# Patient Record
Sex: MALE | Race: WHITE | NOT HISPANIC OR LATINO
[De-identification: names, ages, dates, MRNs, and addresses within clinical notes are randomized per-mention and may not be internally consistent; named-entity substitution may affect disease eponyms.]

---

## 2020-02-12 ENCOUNTER — TRANSCRIPTION ENCOUNTER (OUTPATIENT)
Age: 65
End: 2020-02-12

## 2020-02-13 ENCOUNTER — OUTPATIENT (OUTPATIENT)
Dept: OUTPATIENT SERVICES | Facility: HOSPITAL | Age: 65
LOS: 1 days | End: 2020-02-13
Payer: COMMERCIAL

## 2020-02-13 DIAGNOSIS — Z12.11 ENCOUNTER FOR SCREENING FOR MALIGNANT NEOPLASM OF COLON: ICD-10-CM

## 2020-02-13 PROCEDURE — 45378 DIAGNOSTIC COLONOSCOPY: CPT

## 2020-02-13 PROCEDURE — 82962 GLUCOSE BLOOD TEST: CPT

## 2021-11-24 ENCOUNTER — APPOINTMENT (OUTPATIENT)
Dept: OTOLARYNGOLOGY | Facility: CLINIC | Age: 66
End: 2021-11-24
Payer: COMMERCIAL

## 2021-11-24 VITALS
WEIGHT: 273 LBS | HEIGHT: 75 IN | DIASTOLIC BLOOD PRESSURE: 75 MMHG | BODY MASS INDEX: 33.94 KG/M2 | SYSTOLIC BLOOD PRESSURE: 111 MMHG

## 2021-11-24 DIAGNOSIS — H93.293 OTHER ABNORMAL AUDITORY PERCEPTIONS, BILATERAL: ICD-10-CM

## 2021-11-24 DIAGNOSIS — G47.33 OBSTRUCTIVE SLEEP APNEA (ADULT) (PEDIATRIC): ICD-10-CM

## 2021-11-24 DIAGNOSIS — Z86.39 PERSONAL HISTORY OF OTHER ENDOCRINE, NUTRITIONAL AND METABOLIC DISEASE: ICD-10-CM

## 2021-11-24 DIAGNOSIS — Z78.9 OTHER SPECIFIED HEALTH STATUS: ICD-10-CM

## 2021-11-24 DIAGNOSIS — Z99.89 OBSTRUCTIVE SLEEP APNEA (ADULT) (PEDIATRIC): ICD-10-CM

## 2021-11-24 PROCEDURE — 69210 REMOVE IMPACTED EAR WAX UNI: CPT

## 2021-11-24 PROCEDURE — 99213 OFFICE O/P EST LOW 20 MIN: CPT | Mod: 25

## 2021-11-24 RX ORDER — EMPAGLIFLOZIN 25 MG/1
TABLET, FILM COATED ORAL
Refills: 0 | Status: ACTIVE | COMMUNITY

## 2021-11-24 RX ORDER — CHROMIUM 200 MCG
TABLET ORAL
Refills: 0 | Status: ACTIVE | COMMUNITY

## 2021-11-24 NOTE — HISTORY OF PRESENT ILLNESS
[de-identified] : Allan Goldberg is a 65 yo male with hx of Type 2 DM who presents for evaluation of cerumen impaction. He notes intermittent bilateral otalgia. He notes some hearing change particularly watching TV. He denies tinnitus. He has rare vertigo when he takes his antihypertensive medication. He denies otalgia, otorrhea, recent ear infections.\par \par Of note, he also has right septal deviation. He has right nasal obstruction. He denies sinus pressure, rhinorrhea, or postnasal drainage. He has tried using nasal steroids previously and these have not helped.

## 2021-11-24 NOTE — PHYSICAL EXAM
[de-identified] : Bilateral cerumen impaction [] : septum deviated to the right [de-identified] : Negative parish maneuver [Midline] : trachea located in midline position [Normal] : no rashes

## 2021-11-24 NOTE — ASSESSMENT
[FreeTextEntry1] : Allan Goldberg presents for bilateral cerumen impaction. Once removed, he noted improvement in otologic symptoms and hearing. He would like a hearing test after the new year. In addition, he has right sided septal deviatoin with right nasla obstruction, not responsive to prevoius topical therapy. HE would like to consider septoplasy and SMR of inferior turbinates.\par \par - Follow up in 2 months for audiogram and to discuss surgery again.

## 2021-11-24 NOTE — REASON FOR VISIT
[Subsequent Evaluation] : a subsequent evaluation for [Ear Pain] : ear pain [FreeTextEntry2] : ear cleaning

## 2022-01-25 ENCOUNTER — APPOINTMENT (OUTPATIENT)
Dept: OTOLARYNGOLOGY | Facility: CLINIC | Age: 67
End: 2022-01-25

## 2022-03-14 ENCOUNTER — APPOINTMENT (OUTPATIENT)
Dept: OTOLARYNGOLOGY | Facility: CLINIC | Age: 67
End: 2022-03-14
Payer: COMMERCIAL

## 2022-03-14 VITALS
SYSTOLIC BLOOD PRESSURE: 128 MMHG | HEIGHT: 75 IN | HEART RATE: 76 BPM | BODY MASS INDEX: 33.57 KG/M2 | DIASTOLIC BLOOD PRESSURE: 78 MMHG | WEIGHT: 270 LBS

## 2022-03-14 DIAGNOSIS — E03.9 HYPOTHYROIDISM, UNSPECIFIED: ICD-10-CM

## 2022-03-14 PROCEDURE — 31231 NASAL ENDOSCOPY DX: CPT

## 2022-03-14 PROCEDURE — 99214 OFFICE O/P EST MOD 30 MIN: CPT | Mod: 25

## 2022-03-14 RX ORDER — METFORMIN ER 750 MG 750 MG/1
750 TABLET ORAL
Qty: 60 | Refills: 0 | Status: ACTIVE | COMMUNITY
Start: 2021-06-13

## 2022-03-14 RX ORDER — LEVOTHYROXINE SODIUM 0.14 MG/1
137 TABLET ORAL
Qty: 30 | Refills: 0 | Status: ACTIVE | COMMUNITY
Start: 2022-03-07

## 2022-03-14 RX ORDER — BLOOD SUGAR DIAGNOSTIC
STRIP MISCELLANEOUS
Qty: 50 | Refills: 0 | Status: ACTIVE | COMMUNITY
Start: 2021-11-26

## 2022-03-14 RX ORDER — POTASSIUM CHLORIDE 750 MG/1
10 TABLET, EXTENDED RELEASE ORAL
Qty: 60 | Refills: 0 | Status: ACTIVE | COMMUNITY
Start: 2022-03-07

## 2022-03-14 RX ORDER — HYDROCHLOROTHIAZIDE 25 MG/1
25 TABLET ORAL
Qty: 30 | Refills: 0 | Status: ACTIVE | COMMUNITY
Start: 2021-06-14

## 2022-03-14 NOTE — DATA REVIEWED
[de-identified] : Outside CT Facial bone: Acute fracture of bilateral nasal bones with displaced left nasal bone fracture.

## 2022-03-14 NOTE — HISTORY OF PRESENT ILLNESS
[de-identified] : Allan Goldberg is a 67 yo male with hx of Type 2 DM who presents for evaluation of cerumen impaction. He notes intermittent bilateral otalgia. He notes some hearing change particularly watching TV. He denies tinnitus. He has rare vertigo when he takes his antihypertensive medication. He denies otalgia, otorrhea, recent ear infections.\par \par Of note, he also has right septal deviation. He has right nasal obstruction. He denies sinus pressure, rhinorrhea, or postnasal drainage. He has tried using nasal steroids previously and these have not helped. [FreeTextEntry1] : 3/14/22 - Patient presents for follow-up. He states that he fell down while running on 3/10/22. He had epistaxis which resolved. He presented to the ED. CT head was normal. CT facial bone showed nasal bone fracture. He denies vision chagnes or pain/restrictionf of extraocular movements. He loss of consciousness. He states that the bite feels normal. He denies facial weakness or facial numbness. He denies fevers or chills. He feels that his nasal dorsum is shifted to the left. He denies nasal obstruction.

## 2022-03-14 NOTE — ASSESSMENT
[FreeTextEntry1] : Allan Goldberg presents for evaluation of nasal bone fracture. This occurred on 3/10/22. CT facial bone shows bilateral nasal bone fractures. There is no septal hematoma on endoscopy. He has existing septal deviation to the right. We discussed closed reduction of nasal bone fracture in the OR. Risks of surgery were discussed including but not limited to bleeding, infection, cosmetic deformity, re-deviation of nose. All questions were answered. We will proceed with surgery.\par \par Follow up 1 week postop.

## 2022-03-14 NOTE — PHYSICAL EXAM
[Nasal Endoscopy Performed] : nasal endoscopy was performed, see procedure section for findings [] : septum deviated to the right [Midline] : trachea located in midline position [Normal] : no rashes [de-identified] : Dorsal deviation to the left with bruise over nasal dorsum. No septal hematoma. [FreeTextEntry2] : Small healed laceration of right upper eyelid.

## 2022-03-16 ENCOUNTER — APPOINTMENT (OUTPATIENT)
Dept: OTOLARYNGOLOGY | Facility: CLINIC | Age: 67
End: 2022-03-16
Payer: OTHER MISCELLANEOUS

## 2022-03-16 ENCOUNTER — TRANSCRIPTION ENCOUNTER (OUTPATIENT)
Age: 67
End: 2022-03-16

## 2022-03-16 VITALS
HEART RATE: 63 BPM | WEIGHT: 270 LBS | HEIGHT: 75 IN | BODY MASS INDEX: 33.57 KG/M2 | DIASTOLIC BLOOD PRESSURE: 82 MMHG | SYSTOLIC BLOOD PRESSURE: 132 MMHG

## 2022-03-16 DIAGNOSIS — E11.9 TYPE 2 DIABETES MELLITUS W/OUT COMPLICATIONS: ICD-10-CM

## 2022-03-16 PROCEDURE — 99244 OFF/OP CNSLTJ NEW/EST MOD 40: CPT

## 2022-03-16 PROCEDURE — 99072 ADDL SUPL MATRL&STAF TM PHE: CPT

## 2022-03-16 RX ORDER — DULAGLUTIDE 0.75 MG/.5ML
0.75 INJECTION, SOLUTION SUBCUTANEOUS
Refills: 0 | Status: COMPLETED | COMMUNITY
End: 2022-03-16

## 2022-03-16 RX ORDER — LOSARTAN POTASSIUM 100 MG/1
100 TABLET, FILM COATED ORAL
Refills: 0 | Status: DISCONTINUED | COMMUNITY
End: 2022-03-16

## 2022-03-16 RX ORDER — LOSARTAN POTASSIUM AND HYDROCHLOROTHIAZIDE 25; 100 MG/1; MG/1
100-25 TABLET ORAL
Refills: 0 | Status: ACTIVE | COMMUNITY

## 2022-03-16 RX ORDER — METFORMIN HYDROCHLORIDE 1000 MG/1
1000 TABLET, FILM COATED, EXTENDED RELEASE ORAL
Refills: 0 | Status: COMPLETED | COMMUNITY
End: 2022-03-16

## 2022-03-16 RX ORDER — LOSARTAN POTASSIUM AND HYDROCHLOROTHIAZIDE 25; 100 MG/1; MG/1
100-25 TABLET ORAL
Qty: 30 | Refills: 0 | Status: COMPLETED | COMMUNITY
Start: 2021-11-07 | End: 2022-03-16

## 2022-03-16 RX ORDER — CHLORHEXIDINE GLUCONATE, 0.12% ORAL RINSE 1.2 MG/ML
0.12 SOLUTION DENTAL
Qty: 473 | Refills: 0 | Status: COMPLETED | COMMUNITY
Start: 2021-09-02 | End: 2022-03-16

## 2022-03-16 NOTE — PHYSICAL EXAM
[Hearing Turner Test (Tuning Fork On Forehead)] : no lateralization of tone [Normal] : mucosa is normal [Midline] : trachea located in midline position [Removed] : palatine tonsils previously removed [FreeTextEntry9] : Cerumen impaction removed via curettage.  [FreeTextEntry1] : nasal dorsum displaced to the left side. bump on the top. no septal hematoma. septum deviated to right- old in nature. [de-identified] : no entrapment of the globes.  b/l ecchymosis right eye worse than left.

## 2022-03-16 NOTE — ASSESSMENT
[FreeTextEntry1] : Reviewed and reconciled medications, allergies, PMHx, PSHx, SocHx, FMHx. \par \par h/o MATHEW- on CPAP\par nasal fx\par left cerumen impaction\par nasal dorsum displaced to the left side. \par bump on the top of nose \par septum deviated to right- old in nature\par \par CT facial bones 3/10/22: post traumatic fx of b/l nasal bones displaced to the left. multiple breaks displaced to the left and depressed. \par \par Plan:\par Cerumen removed. Reviewed CT head results. Discussed r/b/a of closed reduction nasal fx.

## 2022-03-16 NOTE — ADDENDUM
[FreeTextEntry1] : Documented by Dwight Wagner acting as scribe for Dr. Ames on 03/16/2022.\par \par All Medical record entries made by the Scribe were at my, Dr. Ames, direction and personally dictated by me on 03/16/2022. I have reviewed the chart and agree that the record accurately reflects my personal performance of the history, physical exam, assessment and plan. I have also personally directed, reviewed, and agreed with the discharge instructions.

## 2022-03-16 NOTE — HISTORY OF PRESENT ILLNESS
[de-identified] : The patient presents today for initial consultation for nasal fx. 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 Pt reports that he suffered a contusion to the head, an abrasion above right eyebrow and a broken nose. Pt had CT imaging done soon after the accident. \par \par

## 2022-03-16 NOTE — CONSULT LETTER
[Dear  ___] : Dear  [unfilled], [Consult Letter:] : I had the pleasure of evaluating your patient, [unfilled]. [Please see my note below.] : Please see my note below. [Consult Closing:] : Thank you very much for allowing me to participate in the care of this patient.  If you have any questions, please do not hesitate to contact me. [Sincerely,] : Sincerely, [FreeTextEntry3] : Myke Ames MD FACS

## 2022-03-24 ENCOUNTER — APPOINTMENT (OUTPATIENT)
Dept: OTOLARYNGOLOGY | Facility: AMBULATORY MEDICAL SERVICES | Age: 67
End: 2022-03-24
Payer: OTHER MISCELLANEOUS

## 2022-03-24 PROCEDURE — 21320 CLSD TX NSL FX W/MNPJ&STABLJ: CPT

## 2022-04-04 ENCOUNTER — APPOINTMENT (OUTPATIENT)
Dept: OTOLARYNGOLOGY | Facility: CLINIC | Age: 67
End: 2022-04-04
Payer: OTHER MISCELLANEOUS

## 2022-04-04 VITALS
HEIGHT: 75 IN | WEIGHT: 270 LBS | HEART RATE: 75 BPM | SYSTOLIC BLOOD PRESSURE: 130 MMHG | BODY MASS INDEX: 33.57 KG/M2 | DIASTOLIC BLOOD PRESSURE: 85 MMHG

## 2022-04-04 DIAGNOSIS — S09.93XS UNSPECIFIED INJURY OF FACE, SEQUELA: ICD-10-CM

## 2022-04-04 PROCEDURE — 99072 ADDL SUPL MATRL&STAF TM PHE: CPT

## 2022-04-04 PROCEDURE — 99212 OFFICE O/P EST SF 10 MIN: CPT

## 2022-04-04 NOTE — HISTORY OF PRESENT ILLNESS
[de-identified] : The patient presents today s/p closed reduction of nasal septal fracture 03/24/2022. Pt reports having lots of debride present.

## 2022-04-04 NOTE — PROCEDURE
[FreeTextEntry1] : mucus, crusting, scabbing, and cast removed [FreeTextEntry2] : post op patency  [FreeTextEntry3] : deviated septum to the right \par lots of dry mucus, crusting, and scabbing all removed through suction bilaterally \par old nose cast removed - new cast placed on

## 2022-04-04 NOTE — ASSESSMENT
[FreeTextEntry1] : Reviewed and reconciled medications, allergies, PMHx, PSHx, SocHx, FMHx. \par \par s/p closed reduction of nasal septal fracture 03/24/2022\par \par Plan:\par mucus, crusting, scabbing, and cast removed - wear the cast at night when sleeping for at least 1 week. new cast placed on. can go back to work. continue using saline gel spray. FU prn  \par \par \par \par

## 2022-04-04 NOTE — ADDENDUM
[FreeTextEntry1] : Documented by Janis Yates acting as scribe for Dr. Ames on 04/04/2022. \par \par All Medical record entries made by the scribe were at my. Dr. Ames direction and personally dictated by me on 04/04/2022. I have reviewed the chart and agree that the record accurately reflects my personal performance of the history, physical exam, assessment and plan. I have also personally directed, reviewed, and agreed with the discharge instructions.

## 2022-04-04 NOTE — CONSULT LETTER
[Dear  ___] : Dear  [unfilled], [Courtesy Letter:] : I had the pleasure of seeing your patient, [unfilled], in my office today. [Please see my note below.] : Please see my note below. [Consult Closing:] : Thank you very much for allowing me to participate in the care of this patient.  If you have any questions, please do not hesitate to contact me. [Sincerely,] : Sincerely, [FreeTextEntry3] : Myke Ames MD FACS

## 2022-04-16 ENCOUNTER — EMERGENCY (EMERGENCY)
Facility: HOSPITAL | Age: 67
LOS: 1 days | Discharge: HOME | End: 2022-04-16
Attending: EMERGENCY MEDICINE | Admitting: EMERGENCY MEDICINE
Payer: COMMERCIAL

## 2022-04-16 VITALS
OXYGEN SATURATION: 95 % | SYSTOLIC BLOOD PRESSURE: 131 MMHG | HEART RATE: 70 BPM | TEMPERATURE: 98 F | DIASTOLIC BLOOD PRESSURE: 81 MMHG | RESPIRATION RATE: 20 BRPM

## 2022-04-16 VITALS
TEMPERATURE: 98 F | RESPIRATION RATE: 20 BRPM | DIASTOLIC BLOOD PRESSURE: 83 MMHG | HEART RATE: 74 BPM | HEIGHT: 75 IN | SYSTOLIC BLOOD PRESSURE: 146 MMHG | OXYGEN SATURATION: 95 % | WEIGHT: 270.07 LBS

## 2022-04-16 PROCEDURE — 99284 EMERGENCY DEPT VISIT MOD MDM: CPT

## 2022-04-16 NOTE — CONSULT NOTE ADULT - SUBJECTIVE AND OBJECTIVE BOX
[facility] DEPARTMENT OF OPHTHALMOLOGY - INITIAL ADULT CONSULT  -----------------------------------------------------------------------------  Andreina Parker MD, MPH, PGY-3  Pager: [phone]  -----------------------------------------------------------------------------  note started prior to pt encounter    HPI: 67y.o. M c/o floaters right eye, has h/o floaters previously and follows with an ophthalmologist, but now floaters have increased in the right lateral visual field, no loss of vision, no pain, no ha, no discharge, pt reports falling about 5wks ago, at the time had periorbital hematoma/nasal fx/other ortho injuries, seen by ED, ct face without periorbital fracture, had no visual changes at that time, pt concerned that this could be delayed sequelae of the fall (states was a workman's comp issue.      PMH: ***  POcHx: denies surg/laser  FH: denies glc/amd  Social History: denies etoh/tobacco  Ophthalmic Medications: none  Allergies: NKDA    Review of Systems:  Constitutional: No fever, chills  Eyes: No blurry vision, flashes, floaters, FBS, erythema, discharge, double vision, OU  Neuro: No tremors  Cardiovascular: No chest pain, palpitations  Respiratory: No SOB, no cough  GI: No nausea, vomiting, abdominal pain  : No dysuria  Skin: no rash  Psych: no depression  Endocrine: no polyuria, polydipsia  Heme/lymph: no swelling    VITALS: T(C): 36.6 (04-16-22 @ 21:08)  T(F): 97.8 (04-16-22 @ 21:08), Max: 97.8 (04-16-22 @ 21:08)  HR: 74 (04-16-22 @ 21:08) (74 - 74)  BP: 146/83 (04-16-22 @ 21:08) (146/83 - 146/83)  RR:  (20 - 20)  SpO2:  (95% - 95%)  Wt(kg): --  General: AAO x 3, appropriate mood and affect    Ophthalmology Exam:  Visual acuity (sc): 20/20 OU  Pupils: PERRL OU, no APD  Ttono: 16 OU  Extraocular movements (EOMs): Full OU, no pain, no diplopia  Confrontational Visual Field (CVF): Full OU  Color Plates: 12/12 OU    Pen Light Exam (PLE)  External: Flat OU  Lids/Lashes/Lacrimal Ducts: Flat OU    Sclera/Conjunctiva: W+Q OU  Cornea: Cl OU  Anterior Chamber: D+F OU    Iris: Flat OU  Lens: Cl OU    Fundus Exam: dilated with 1% tropicamide and 2.5% phenylephrine  Approval obtained from primary team for dilation  Patient aware that pupils can remained dilated for at least 4-6 hours  Exam performed with 20D lens    Vitreous: wnl OU  Disc, cup/disc: sharp and pink, 0.4 OU  Macula: wnl OU  Vessels: wnl OU  Periphery: wnl OU    Labs/Imaging:  *** [facility] DEPARTMENT OF OPHTHALMOLOGY - INITIAL ADULT CONSULT  -----------------------------------------------------------------------------  Andreina Parker MD, MPH, PGY-3  Pager: [phone]  -----------------------------------------------------------------------------    HPI: 67y.o. M c/o floaters right eye, has h/o floaters previously and follows with an ophthalmologist, but now floaters have increased in the right lateral visual field, no loss of vision, no pain, no ha, no discharge, pt reports falling about 5wks ago, at the time had periorbital hematoma/nasal fx/other ortho injuries, seen by ED, ct face without periorbital fracture, had no visual changes at that time, pt concerned that this could be delayed sequelae of the fall (states was a workman's comp issue).     Ophtho consulted for pt concern of retinal detachment in s/o floaters, right eye. Pt states he had floaters in the past but over the last 24 hrs he has noticed a cluster of floaters in the temporal part of his visual field that is more apparent in bright light. No association with flashes of light and no curtain. Denies loss of vision. Wife at bedside and states that pt reported the floaters come and go and were improving this morning. Pt had a fall 3/10 and he is concerned that the symptoms are related. Follows with Dr. Palencia (Steve) for regular eye care, annual diabetic eye exams and refraction. Has never had lasers or injections or ophthalmic surgery. Of note, pt works as a podiatrist at [employer].     PMH: per HPI   POcHx: denies surg/laser  FH: denies glc/amd  Social History: denies etoh/tobacco  Ophthalmic Medications: none  Allergies: NKDA    Review of Systems:  Constitutional: No fever, chills  Eyes: No blurry vision, flashes, + floaters, FBS, erythema, discharge, double vision, OU  Neuro: No tremors  Cardiovascular: No chest pain, palpitations  Respiratory: No SOB, no cough  GI: No nausea, vomiting, abdominal pain  : No dysuria  Skin: no rash  Psych: no depression  Endocrine: no polyuria, polydipsia  Heme/lymph: no swelling    VITALS: T(C): 36.6 (04-16-22 @ 21:08)  T(F): 97.8 (04-16-22 @ 21:08), Max: 97.8 (04-16-22 @ 21:08)  HR: 74 (04-16-22 @ 21:08) (74 - 74)  BP: 146/83 (04-16-22 @ 21:08) (146/83 - 146/83)  RR:  (20 - 20)  SpO2:  (95% - 95%)  Wt(kg): --  General: AAO x 3, appropriate mood and affect    Ophthalmology Exam:  Visual acuity (cc): 20/20 OU  Pupils: PERRL OU, no APD  Ttono: 16, 20   Extraocular movements (EOMs): Full OU, no pain, no diplopia  Confrontational Visual Field (CVF): Full OU  Color Plates: 12/12 OU    Pen Light Exam (PLE)  External: Flat OU  Lids/Lashes/Lacrimal Ducts: Flat OU    Sclera/Conjunctiva: W+Q OU  Cornea: Cl OU  Anterior Chamber: D+F OU    Iris: Flat OU  Lens: 2-3+ NS OU    Fundus Exam: dilated with 1% tropicamide and 2.5% phenylephrine  Approval obtained from primary team for dilation  Patient aware that pupils can remained dilated for at least 4-6 hours  Exam performed with 20D lens    Vitreous: Cuellar ring OD, clear OS  Disc, cup/disc: sharp and pink, 0.4 OU  Macula: wnl OU  Vessels: wnl OU  Periphery: wnl OU    Labs/Imaging:  N/A    Assessment and Recommendations:  67y.o. M c/o floaters right eye, has h/o floaters previously and follows with an ophthalmologist, but now floaters have increased in the right lateral visual field. Found to have PVD OD without evidence on exam of retinal tears or detachments.     #PVD OD   - RD precautions discussed   - Pt to follow-up with Dr. Palencia on Tuesday     #DM without evidence retinopathy OU   - BG control   - Continue with annual exams per Dr. Palencia     #Cataracts OU   - Mild, CTM    Findings discussed with pt and primary team    Outpatient follow-up: Patient should follow-up with his/her ophthalmologist or with [facility] Department of Ophthalmology within 1 week of after discharge at:    [address]  [place] [address]  [phone]    Andreina Parker MD, MPH PGY-3  Pager: [phone]  [employer] Voice: [phone]   Also available on [employer] Teams

## 2022-04-16 NOTE — ED PROVIDER NOTE - PROGRESS NOTE DETAILS
pt seen by ophtho resident, has vitreous detachment, no intervention required, has appt to f/u with his ophthalmologist tuesday, can f/u as scheduled.

## 2022-04-16 NOTE — ED ADULT NURSE NOTE - NS PRO PASSIVE SMOKE EXP
[facility] calling for referral. They gave a different fax number than what is on the [facility] referral site. Faxed to [phone].   No

## 2022-04-16 NOTE — ED PROVIDER NOTE - PATIENT PORTAL LINK FT
You can access the FollowMyHealth Patient Portal offered by [facility] by registering at the following website: [email or web address]. By joining [employer]’s FollowMyHealth portal, you will also be able to view your health information using other applications (apps) compatible with our system.

## 2022-04-16 NOTE — ED PROVIDER NOTE - NSFOLLOWUPINSTRUCTIONS_ED_ALL_ED_FT
Vitreous Detachment       Vitreous detachment is part of the normal aging process in the eyes. Vitreous is the jelly-like substance that makes up most of the inside of the eyeballs. It helps the eyeballs keep a round shape. The vitreous is attached to the retina of the eye with a series of fibers.    As you age, the vitreous gradually shrinks. Tension increases between the fibers and the retina. Eventually, the fibers can break free from the retina, causing vitreous detachment. In most cases, this does not cause problems and does not require treatment. However, it can sometimes cause the retina to separate from the eyeball (retinal detachment), which requires treatment to prevent vision loss.      What are the causes?    Aging is the main cause of vitreous detachment. Everyone's vitreous naturally shrinks with age.      What increases the risk?    You are more likely to have vitreous detachment if you:  •Are at least 50 years old.      •Have inflammation of the eye.      •Have an eye injury.      •Have had eye surgery.      •Have a hemorrhage in your eye.      •Are very nearsighted (myopia).      •Have diabetes.        What are the signs or symptoms?    Most people with this condition will not notice any symptoms. If symptoms do occur, the most common are floaters. Floaters occur as the vitreous begins to shrink. They may:  •Appear as tiny dots or webs in your vision.      •Seem to disappear when you look at them directly.      •Appear more often as your condition gets worse.      Other symptoms include:  •Flashes of light (photopsia) in your peripheral vision that may look like lightning streaks.      •Decreased vision or a dark curtain or shadow moving across your field of vision. This is rare.        How is this diagnosed?    This condition may be diagnosed based on:  •Your signs and symptoms.    •An exam by a health care provider who specializes in conditions and diseases of the eye (ophthalmologist). The exam may include:  •Putting eye drops in your eye to make the pupil wider (dilated). The pupil is the opening in the center of the eye.      •Checking the pupils with a magnifying glass. This exam is the best way to determine the type and extent of damage to your eye.          How is this treated?    For most people, a vitreous detachment is harmless, causing no symptoms or vision loss, and does not require treatment. Floaters usually become less noticeable over time.    If the condition causes retinal detachment, you may need eye surgery to reattach your retina (reattachment surgery) in order to prevent vision loss or restore your vision.      Follow these instructions at home:    •Keep all follow-up visits as told by your health care provider. This is important.        Get help right away if:  •You develop signs of retinal detachment. These include:  •A sudden increase in the number of floaters you see.      •An increase in the number of flashes of light you see in your peripheral vision.      •Decreased vision.          Summary    •Vitreous detachment is part of the normal aging process in the eyes.      •Vitreous is the jelly-like substance inside the eyeballs. As you age, the vitreous shrinks, and the fibers that attach the vitreous to the retina can break free, causing vitreous detachment.      •In most cases, vitreous detachment does not cause symptoms and does not require treatment. The most common symptom that can occur is seeing floaters that appear as tiny dots or webs in your vision.      •Vitreous detachment can sometimes cause the retina to separate from the eyeball (retinal detachment). This must be treated to prevent vision loss.      This information is not intended to replace advice given to you by your health care provider. Make sure you discuss any questions you have with your health care provider.

## 2022-04-16 NOTE — ED ADULT NURSE NOTE - OBJECTIVE STATEMENT
pt c/o worsening right eye floaters since last night, states had a fall 1 month ago, 20/25 both eyes, had chronic floaters but states they are worsening now, follows with an ophthalmologist, denies loss of vision, no pain, no ha, no discharge

## 2022-04-16 NOTE — ED PROVIDER NOTE - OBJECTIVE STATEMENT
67y.o. M c/o floaters right eye, has h/o floaters previously 67y.o. M c/o floaters right eye, has h/o floaters previously and follows with an ophthalmologist, but now floaters have increased in the right lateral visual field, no loss of vision, no pain, no ha, no discharge, pt reports falling about 5wks ago, at the time had periorbital hematoma/nasal fx/other ortho injuries, seen by ED, ct face without periorbital fracture, had no visual changes at that time, pt concerned that this could be delayed sequelae of the fall (states was a workman's comp issue)

## 2022-12-06 ENCOUNTER — OFFICE (OUTPATIENT)
Dept: URBAN - METROPOLITAN AREA CLINIC 109 | Facility: CLINIC | Age: 67
Setting detail: OPHTHALMOLOGY
End: 2022-12-06
Payer: COMMERCIAL

## 2022-12-06 VITALS — HEIGHT: 60 IN

## 2022-12-06 DIAGNOSIS — H16.223: ICD-10-CM

## 2022-12-06 DIAGNOSIS — H25.13: ICD-10-CM

## 2022-12-06 DIAGNOSIS — E11.9: ICD-10-CM

## 2022-12-06 DIAGNOSIS — H43.393: ICD-10-CM

## 2022-12-06 PROCEDURE — 92014 COMPRE OPH EXAM EST PT 1/>: CPT | Performed by: OPHTHALMOLOGY

## 2022-12-06 ASSESSMENT — REFRACTION_CURRENTRX
OS_OVR_VA: 20/
OS_ADD: +2.25
OS_SPHERE: -4.50
OD_OVR_VA: 20/
OD_AXIS: 27
OD_ADD: +2.25
OD_CYLINDER: -0.75
OD_SPHERE: -3.50
OS_CYLINDER: -1.25
OS_AXIS: 177

## 2022-12-06 ASSESSMENT — VISUAL ACUITY
OS_BCVA: 20/25
OD_BCVA: 20/25

## 2022-12-06 ASSESSMENT — REFRACTION_AUTOREFRACTION
OS_SPHERE: -4.25
OS_CYLINDER: -1.00
OS_AXIS: 155
OD_AXIS: 176
OD_CYLINDER: -0.25
OD_SPHERE: -4.75

## 2022-12-06 ASSESSMENT — SPHEQUIV_DERIVED
OD_SPHEQUIV: -4.875
OS_SPHEQUIV: -4.75

## 2022-12-06 ASSESSMENT — CONFRONTATIONAL VISUAL FIELD TEST (CVF)
OD_FINDINGS: FULL
OS_FINDINGS: FULL

## 2022-12-06 ASSESSMENT — TONOMETRY: OS_IOP_MMHG: 10

## 2024-03-17 ENCOUNTER — EMERGENCY (EMERGENCY)
Facility: HOSPITAL | Age: 69
LOS: 1 days | Discharge: HOME | End: 2024-03-17
Attending: INTERNAL MEDICINE | Admitting: INTERNAL MEDICINE
Payer: COMMERCIAL

## 2024-03-17 VITALS
WEIGHT: 274.92 LBS | DIASTOLIC BLOOD PRESSURE: 82 MMHG | HEIGHT: 75 IN | OXYGEN SATURATION: 95 % | SYSTOLIC BLOOD PRESSURE: 147 MMHG | HEART RATE: 72 BPM | TEMPERATURE: 98 F | RESPIRATION RATE: 18 BRPM

## 2024-03-17 VITALS
SYSTOLIC BLOOD PRESSURE: 125 MMHG | OXYGEN SATURATION: 96 % | HEART RATE: 76 BPM | DIASTOLIC BLOOD PRESSURE: 84 MMHG | RESPIRATION RATE: 18 BRPM | TEMPERATURE: 97 F

## 2024-03-17 PROBLEM — E11.9 TYPE 2 DIABETES MELLITUS WITHOUT COMPLICATIONS: Chronic | Status: ACTIVE | Noted: 2022-04-16

## 2024-03-17 PROBLEM — I10 ESSENTIAL (PRIMARY) HYPERTENSION: Chronic | Status: ACTIVE | Noted: 2022-04-16

## 2024-03-17 PROBLEM — E03.9 HYPOTHYROIDISM, UNSPECIFIED: Chronic | Status: ACTIVE | Noted: 2022-04-16

## 2024-03-17 PROCEDURE — 70160 X-RAY EXAM OF NASAL BONES: CPT

## 2024-03-17 PROCEDURE — 99284 EMERGENCY DEPT VISIT MOD MDM: CPT

## 2024-03-17 PROCEDURE — 99284 EMERGENCY DEPT VISIT MOD MDM: CPT | Mod: 25

## 2024-03-17 PROCEDURE — 70450 CT HEAD/BRAIN W/O DYE: CPT | Mod: 26,MC

## 2024-03-17 PROCEDURE — 70160 X-RAY EXAM OF NASAL BONES: CPT | Mod: 26

## 2024-03-17 PROCEDURE — 70450 CT HEAD/BRAIN W/O DYE: CPT | Mod: MC

## 2024-03-17 RX ORDER — ROSUVASTATIN CALCIUM 5 MG/1
1 TABLET ORAL
Refills: 0 | DISCHARGE

## 2024-03-17 RX ORDER — LEVOTHYROXINE SODIUM 125 MCG
1 TABLET ORAL
Refills: 0 | DISCHARGE

## 2024-03-17 RX ORDER — HYDROCHLOROTHIAZIDE 25 MG
1 TABLET ORAL
Refills: 0 | DISCHARGE

## 2024-03-17 RX ORDER — METFORMIN HYDROCHLORIDE 850 MG/1
2 TABLET ORAL
Refills: 0 | DISCHARGE

## 2024-03-17 RX ORDER — LOSARTAN POTASSIUM 100 MG/1
1 TABLET, FILM COATED ORAL
Refills: 0 | DISCHARGE

## 2024-03-17 RX ORDER — FOLIC ACID 0.8 MG
1 TABLET ORAL
Refills: 0 | DISCHARGE

## 2024-03-17 RX ORDER — TIRZEPATIDE 15 MG/.5ML
7.5 INJECTION, SOLUTION SUBCUTANEOUS
Refills: 0 | DISCHARGE

## 2024-03-17 NOTE — ED PROVIDER NOTE - CARE PROVIDER_API CALL
Myke Ames  Otolaryngology  [address]  [place] [address]  Phone: [phone]  Fax: [phone]  Follow Up Time: 1-3 Days

## 2024-03-17 NOTE — ED PROVIDER NOTE - NSFOLLOWUPINSTRUCTIONS_ED_ALL_ED_FT
1) Follow-up with your Primary Medical Doctor or referred doctor. Call today / next business day for prompt follow-up.  2) Return to Emergency room for any worsening or persistent pain, dizziness, difficulty walking, feeling unsteady, vomiting, visual changes, numbness, tingling, any unknown fluid coming out of nose or ears, any changes in your speech,  worsening or persistent headaches,  weakness, fever, or any other concerning symptoms.  3) See attached instruction sheets for additional information, including information regarding signs and symptoms to look out for, reasons to seek immediate care and other important instructions.  4) You should avoid any activities where you may hit your head until cleared by your doctor, at least two weeks.  5) Follow-up with Neurology, especially if your symptoms persist.  Dr Kaylene Martinez: [phone]  [facility]): [phone]  [facility]: [phone]  6) Tylenol as needed for pain

## 2024-03-17 NOTE — ED ADULT NURSE REASSESSMENT NOTE - NS ED NURSE REASSESS COMMENT FT1
Pt received from night RN. Pt A&Ox4. Pt states his pain is much better. Facial abrasions noted. Wife at bedside. Pt pending results from imaging.

## 2024-03-17 NOTE — ED ADULT TRIAGE NOTE - CHIEF COMPLAINT QUOTE
Pt was in bed and rolled out and hit face on night stand.  Pt has abrasions to left forehead and left eye lid, laceration to nose.  Pt has h/o broken nose on 3/10/22.  No LOC.  Denies blood thinners.

## 2024-03-17 NOTE — ED PROVIDER NOTE - ENMT, MLM
Airway patent, Nasal tenderness, abrasion . Mouth with normal mucosa. Throat has no vesicles, no oropharyngeal exudates and uvula is midline.

## 2024-03-17 NOTE — ED PROVIDER NOTE - CARE PLAN
1 Principal Discharge DX:	Head trauma  Secondary Diagnosis:	Nasal fracture   Principal Discharge DX:	Head trauma  Secondary Diagnosis:	Nasal fracture  Secondary Diagnosis:	Facial abrasion

## 2024-03-17 NOTE — ED PROVIDER NOTE - OBJECTIVE STATEMENT
69 y/o male fell out of bed and contused his head  and nose on the night table , mild headache, left sided muscular neck pain, no LOC, nom  N/V no focal neuro changes 67 y/o male fell out of bed and contused his head  and nose on the night table , mild headache, left sided muscular neck pain, no LOC, nom  N/V no focal neuro changes  last tt 2 yrs ago 69 y/o male fell out of bed and contused his head  and nose on the night table , mild headache, left sided muscular neck pain, no LOC, nom  N/V no focal neuro changes, last tetanus was  2 yrs ago

## 2024-03-17 NOTE — ED PROVIDER NOTE - CONSTITUTIONAL, MLM
normal... Well appearing, awake, alert, oriented to person, place, time/situation and mild to moderate  distress.

## 2024-03-17 NOTE — ED PROVIDER NOTE - PATIENT PORTAL LINK FT
You can access the FollowMyHealth Patient Portal offered by [facility] by registering at the following website: [email or web address]. By joining [employer]’s FollowMyHealth portal, you will also be able to view your health information using other applications (apps) compatible with our system.

## 2024-03-17 NOTE — ED PROVIDER NOTE - CLINICAL SUMMARY MEDICAL DECISION MAKING FREE TEXT BOX
s/p fall, head trauma, nasal trauma, abrasion , facial ecchymosis,  CT head normal, xray nasal , non displaced fx, exam stable patient referred to [facility]

## 2024-03-17 NOTE — ED PROVIDER NOTE - ADDITIONAL NOTES AND INSTRUCTIONS:
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  Thank You, Dr Monroe

## 2024-03-18 ENCOUNTER — NON-APPOINTMENT (OUTPATIENT)
Age: 69
End: 2024-03-18

## 2024-03-18 ENCOUNTER — APPOINTMENT (OUTPATIENT)
Dept: OTOLARYNGOLOGY | Facility: CLINIC | Age: 69
End: 2024-03-18
Payer: COMMERCIAL

## 2024-03-18 VITALS
WEIGHT: 273 LBS | DIASTOLIC BLOOD PRESSURE: 71 MMHG | HEIGHT: 75 IN | BODY MASS INDEX: 33.94 KG/M2 | HEART RATE: 68 BPM | SYSTOLIC BLOOD PRESSURE: 105 MMHG

## 2024-03-18 DIAGNOSIS — S02.2XXA FRACTURE OF NASAL BONES, INITIAL ENCOUNTER FOR CLOSED FRACTURE: ICD-10-CM

## 2024-03-18 DIAGNOSIS — H61.23 IMPACTED CERUMEN, BILATERAL: ICD-10-CM

## 2024-03-18 PROCEDURE — 99213 OFFICE O/P EST LOW 20 MIN: CPT | Mod: 25

## 2024-03-18 PROCEDURE — 69210 REMOVE IMPACTED EAR WAX UNI: CPT

## 2024-03-18 RX ORDER — TIRZEPATIDE 7.5 MG/.5ML
7.5 INJECTION, SOLUTION SUBCUTANEOUS
Refills: 0 | Status: ACTIVE | COMMUNITY

## 2024-03-18 RX ORDER — DULAGLUTIDE 1.5 MG/.5ML
1.5 INJECTION, SOLUTION SUBCUTANEOUS
Qty: 2 | Refills: 0 | Status: DISCONTINUED | COMMUNITY
Start: 2021-09-06 | End: 2024-03-18

## 2024-03-18 RX ORDER — ROSUVASTATIN CALCIUM 5 MG/1
TABLET, FILM COATED ORAL
Refills: 0 | Status: ACTIVE | COMMUNITY

## 2024-03-18 RX ORDER — SIMVASTATIN 20 MG/1
20 TABLET, FILM COATED ORAL
Refills: 0 | Status: DISCONTINUED | COMMUNITY
End: 2024-03-18

## 2024-03-18 NOTE — HISTORY OF PRESENT ILLNESS
[de-identified] : 69 yr old male fell off his bed, hit his nose and forehead on the dresser 3/17 went to the ER at [facility], had a CT done -dizziness, LOC +epistaxis ( no  anticoags) responded to pressure  +hx nasal fx w closed reduction 2022

## 2024-03-18 NOTE — DATA REVIEWED
[de-identified] :   ACC: [id number] EXAM: XR NASAL BONES MIN 3 VIEWS ORDERED BY: VERENA PATEL  PROCEDURE DATE: 03/17/2024    INTERPRETATION: NASAL BONE RADIOGRAPHS  INDICATION: 68-year-old male post trauma  Findings: 3 views of the nasal bones. There is a noncomminuted, mildly anteriorly displaced fracture at the tip of the nasal bone.  IMPRESSION: Nasal bone fracture as above.  --- End of Report ---  [de-identified] :    ACC: [id number] EXAM: CT BRAIN ORDERED BY: VERENA PATEL  PROCEDURE DATE: 03/17/2024    INTERPRETATION: CT head without IV contrast  CLINICAL INFORMATION: Trauma Intracranial hemorrhage.  TECHNIQUE: Contiguous axial 5 mm sections were obtained through the head. Sagittal and coronal 2-D reformatted images were also obtained. This scan was performed using automatic exposure control (radiation dose reduction software) to obtain a diagnostic image quality scan with patient dose as low as reasonably achievable.  FINDINGS: No previous examinations are available for review.  The brain demonstrates no abnormal attenuation. No acute cerebral cortical infarct is seen. No intracranial hemorrhage is found. No mass effect is found in the brain.  The ventricles, sulci and basal cisterns appear unremarkable.  The orbits show mild LEFT periorbital soft tissue swelling. The paranasal sinuses are significant for small retention cyst in the RIGHT maxillary sinus. The nasal cavity appears intact. Mild RIGHT-sided nasal septal deviation with osteophyte. The nasopharynx is symmetric. The central skull base, petrous temporal bones and calvarium remain intact.   IMPRESSION: No acute abnormality within the brain. Mild LEFT periorbital soft tissue swelling. Mild RIGHT-sided nasal septal deviation with osteophyte.  --- End of Report ---

## 2024-03-18 NOTE — PHYSICAL EXAM
[FreeTextEntry1] : ++left periorbital ecchymosis, resolving ecchymosis left forehead [de-identified] : CI AU [] : septum deviated to the right [de-identified] : +dorsal ecchymosis and mild edema w small abrasion [Normal] : mucosa is normal [Midline] : trachea located in midline position

## 2024-03-21 ENCOUNTER — APPOINTMENT (OUTPATIENT)
Dept: OTOLARYNGOLOGY | Facility: CLINIC | Age: 69
End: 2024-03-21
Payer: COMMERCIAL

## 2024-03-21 VITALS
WEIGHT: 273 LBS | DIASTOLIC BLOOD PRESSURE: 78 MMHG | SYSTOLIC BLOOD PRESSURE: 138 MMHG | BODY MASS INDEX: 33.94 KG/M2 | HEART RATE: 73 BPM | HEIGHT: 75 IN

## 2024-03-21 DIAGNOSIS — J34.2 DEVIATED NASAL SEPTUM: ICD-10-CM

## 2024-03-21 DIAGNOSIS — S02.2XXS FRACTURE OF NASAL BONES, SEQUELA: ICD-10-CM

## 2024-03-21 PROCEDURE — 99213 OFFICE O/P EST LOW 20 MIN: CPT | Mod: 25

## 2024-03-21 PROCEDURE — 31231 NASAL ENDOSCOPY DX: CPT

## 2024-03-21 RX ORDER — BLOOD-GLUCOSE,RECEIVER,CONT
EACH MISCELLANEOUS
Qty: 1 | Refills: 0 | Status: ACTIVE | COMMUNITY
Start: 2024-02-28

## 2024-03-21 NOTE — PHYSICAL EXAM
[Nasal Endoscopy Performed] : nasal endoscopy was performed, see procedure section for findings [] : septum deviated to the right [de-identified] : Slight dorsal deviatoin to left. No septal hematoma. [Midline] : trachea located in midline position [Normal] : no rashes [de-identified] : Ecchymosis around left eye.

## 2024-03-21 NOTE — ASSESSMENT
[FreeTextEntry1] : Allan Goldberg presents for evaluation of nasal bone fracture. He has history of nasal bone and septal fractures s/p closed reduction in 2022. He has known septal deviation to right. He had mechanical fall on 3/16/24. X-ray and CT head from ED were reviewed showing nasal bone fracture. On sinonasal endoscopy, he has septal deviation to right, no septal hematoma. He has slight nasal dorsum deviation to left. No other sign of facial fracture. Will obtain CT maxillofacial stat given previous history of nasal bone fracture.   - CT maxillofacial STAT - f/u after CT

## 2024-03-21 NOTE — HISTORY OF PRESENT ILLNESS
[de-identified] : Allan Goldberg is a 65 yo male with hx of Type 2 DM who presents for evaluation of cerumen impaction. He notes intermittent bilateral otalgia. He notes some hearing change particularly watching TV. He denies tinnitus. He has rare vertigo when he takes his antihypertensive medication. He denies otalgia, otorrhea, recent ear infections.\par  \par  Of note, he also has right septal deviation. He has right nasal obstruction. He denies sinus pressure, rhinorrhea, or postnasal drainage. He has tried using nasal steroids previously and these have not helped. [FreeTextEntry1] : 3/14/22 - Patient presents for follow-up. He states that he fell down while running on 3/10/22. He had epistaxis which resolved. He presented to the ED. CT head was normal. CT facial bone showed nasal bone fracture. He denies vision chagnes or pain/restrictionf of extraocular movements. He loss of consciousness. He states that the bite feels normal. He denies facial weakness or facial numbness. He denies fevers or chills. He feels that his nasal dorsum is shifted to the left. He denies nasal obstruction.  3/21/24 - Mr. Goldberg presents for follow-up. He had mechanical fall out of bed on 3/16/24. He did not have loss of consciousness. He had epistaxis which resolved. He denies vision changes or pain/restriction of extraocular movements. He denies facial weakness or facial numbness. He notes that his bite is normal. No fevers or chills. He denies increased nasal obstruction but ntoes chronic right worse than left nasal obstruction. He feels his nasal dorsum is slightly shifted to the left. No fevers or chills.

## 2024-03-21 NOTE — DATA REVIEWED
[de-identified] : X-ray nasal bone: Findings: 3 views of the nasal bones. There is a noncomminuted, mildly anteriorly displaced fracture at the tip of the nasal bone.  IMPRESSION: Nasal bone fracture as above.

## 2024-04-06 ENCOUNTER — OFFICE (OUTPATIENT)
Dept: URBAN - METROPOLITAN AREA CLINIC 109 | Facility: CLINIC | Age: 69
Setting detail: OPHTHALMOLOGY
End: 2024-04-06
Payer: COMMERCIAL

## 2024-04-06 VITALS — HEIGHT: 60 IN

## 2024-04-06 DIAGNOSIS — H16.223: ICD-10-CM

## 2024-04-06 DIAGNOSIS — E11.9: ICD-10-CM

## 2024-04-06 DIAGNOSIS — H25.13: ICD-10-CM

## 2024-04-06 DIAGNOSIS — H43.393: ICD-10-CM

## 2024-04-06 PROCEDURE — 92014 COMPRE OPH EXAM EST PT 1/>: CPT | Performed by: OPHTHALMOLOGY

## 2025-02-09 ENCOUNTER — EMERGENCY (EMERGENCY)
Facility: HOSPITAL | Age: 70
LOS: 1 days | Discharge: HOME | End: 2025-02-09
Attending: EMERGENCY MEDICINE | Admitting: EMERGENCY MEDICINE
Payer: COMMERCIAL

## 2025-02-09 VITALS
TEMPERATURE: 98 F | SYSTOLIC BLOOD PRESSURE: 136 MMHG | HEIGHT: 75 IN | DIASTOLIC BLOOD PRESSURE: 73 MMHG | OXYGEN SATURATION: 99 % | WEIGHT: 235.01 LBS | HEART RATE: 85 BPM | RESPIRATION RATE: 18 BRPM

## 2025-02-09 LAB
ALBUMIN SERPL ELPH-MCNC: 2.1 G/DL — LOW (ref 3.3–5)
ALP SERPL-CCNC: 485 U/L — HIGH (ref 40–120)
ALT FLD-CCNC: 57 U/L — SIGNIFICANT CHANGE UP (ref 12–78)
ANION GAP SERPL CALC-SCNC: 9 MMOL/L — SIGNIFICANT CHANGE UP (ref 5–17)
AST SERPL-CCNC: 137 U/L — HIGH (ref 15–37)
BASOPHILS # BLD AUTO: 0.04 K/UL — SIGNIFICANT CHANGE UP (ref 0–0.2)
BASOPHILS NFR BLD AUTO: 0.3 % — SIGNIFICANT CHANGE UP (ref 0–2)
BILIRUB SERPL-MCNC: 1.5 MG/DL — HIGH (ref 0.2–1.2)
BUN SERPL-MCNC: 18 MG/DL — SIGNIFICANT CHANGE UP (ref 7–23)
CALCIUM SERPL-MCNC: 8.9 MG/DL — SIGNIFICANT CHANGE UP (ref 8.5–10.1)
CHLORIDE SERPL-SCNC: 102 MMOL/L — SIGNIFICANT CHANGE UP (ref 96–108)
CO2 SERPL-SCNC: 24 MMOL/L — SIGNIFICANT CHANGE UP (ref 22–31)
CREAT SERPL-MCNC: 0.95 MG/DL — SIGNIFICANT CHANGE UP (ref 0.5–1.3)
EGFR: 87 ML/MIN/1.73M2 — SIGNIFICANT CHANGE UP
EOSINOPHIL # BLD AUTO: 0.02 K/UL — SIGNIFICANT CHANGE UP (ref 0–0.5)
EOSINOPHIL NFR BLD AUTO: 0.1 % — SIGNIFICANT CHANGE UP (ref 0–6)
GLUCOSE SERPL-MCNC: 112 MG/DL — HIGH (ref 70–99)
HCT VFR BLD CALC: 35.4 % — LOW (ref 39–50)
HGB BLD-MCNC: 11.7 G/DL — LOW (ref 13–17)
IMM GRANULOCYTES NFR BLD AUTO: 0.6 % — SIGNIFICANT CHANGE UP (ref 0–0.9)
LIDOCAIN IGE QN: 29 U/L — SIGNIFICANT CHANGE UP (ref 13–75)
LYMPHOCYTES # BLD AUTO: 1.61 K/UL — SIGNIFICANT CHANGE UP (ref 1–3.3)
LYMPHOCYTES # BLD AUTO: 11.5 % — LOW (ref 13–44)
MCHC RBC-ENTMCNC: 27.8 PG — SIGNIFICANT CHANGE UP (ref 27–34)
MCHC RBC-ENTMCNC: 33.1 G/DL — SIGNIFICANT CHANGE UP (ref 32–36)
MCV RBC AUTO: 84.1 FL — SIGNIFICANT CHANGE UP (ref 80–100)
MONOCYTES # BLD AUTO: 1.18 K/UL — HIGH (ref 0–0.9)
MONOCYTES NFR BLD AUTO: 8.4 % — SIGNIFICANT CHANGE UP (ref 2–14)
NEUTROPHILS # BLD AUTO: 11.08 K/UL — HIGH (ref 1.8–7.4)
NEUTROPHILS NFR BLD AUTO: 79.1 % — HIGH (ref 43–77)
NRBC # BLD: 0 /100 WBCS — SIGNIFICANT CHANGE UP (ref 0–0)
NRBC BLD-RTO: 0 /100 WBCS — SIGNIFICANT CHANGE UP (ref 0–0)
PLATELET # BLD AUTO: 531 K/UL — HIGH (ref 150–400)
POTASSIUM SERPL-MCNC: 3.7 MMOL/L — SIGNIFICANT CHANGE UP (ref 3.5–5.3)
POTASSIUM SERPL-SCNC: 3.7 MMOL/L — SIGNIFICANT CHANGE UP (ref 3.5–5.3)
PROT SERPL-MCNC: 7 G/DL — SIGNIFICANT CHANGE UP (ref 6–8.3)
RBC # BLD: 4.21 M/UL — SIGNIFICANT CHANGE UP (ref 4.2–5.8)
RBC # FLD: 17.4 % — HIGH (ref 10.3–14.5)
SODIUM SERPL-SCNC: 135 MMOL/L — SIGNIFICANT CHANGE UP (ref 135–145)
WBC # BLD: 14.01 K/UL — HIGH (ref 3.8–10.5)
WBC # FLD AUTO: 14.01 K/UL — HIGH (ref 3.8–10.5)

## 2025-02-09 PROCEDURE — 83690 ASSAY OF LIPASE: CPT

## 2025-02-09 PROCEDURE — 99285 EMERGENCY DEPT VISIT HI MDM: CPT | Mod: 25

## 2025-02-09 PROCEDURE — 99285 EMERGENCY DEPT VISIT HI MDM: CPT

## 2025-02-09 PROCEDURE — 96375 TX/PRO/DX INJ NEW DRUG ADDON: CPT

## 2025-02-09 PROCEDURE — 71046 X-RAY EXAM CHEST 2 VIEWS: CPT | Mod: 26

## 2025-02-09 PROCEDURE — 85025 COMPLETE CBC W/AUTO DIFF WBC: CPT

## 2025-02-09 PROCEDURE — 93970 EXTREMITY STUDY: CPT

## 2025-02-09 PROCEDURE — 74177 CT ABD & PELVIS W/CONTRAST: CPT | Mod: MC

## 2025-02-09 PROCEDURE — 71046 X-RAY EXAM CHEST 2 VIEWS: CPT

## 2025-02-09 PROCEDURE — 36415 COLL VENOUS BLD VENIPUNCTURE: CPT

## 2025-02-09 PROCEDURE — 80053 COMPREHEN METABOLIC PANEL: CPT

## 2025-02-09 PROCEDURE — 96374 THER/PROPH/DIAG INJ IV PUSH: CPT | Mod: XU

## 2025-02-09 PROCEDURE — 74177 CT ABD & PELVIS W/CONTRAST: CPT | Mod: 26

## 2025-02-09 RX ORDER — ONDANSETRON 4 MG/1
4 TABLET, ORALLY DISINTEGRATING ORAL ONCE
Refills: 0 | Status: COMPLETED | OUTPATIENT
Start: 2025-02-09 | End: 2025-02-09

## 2025-02-09 RX ORDER — BACTERIOSTATIC SODIUM CHLORIDE 0.9 %
1000 VIAL (ML) INJECTION ONCE
Refills: 0 | Status: COMPLETED | OUTPATIENT
Start: 2025-02-09 | End: 2025-02-09

## 2025-02-09 RX ORDER — ACETAMINOPHEN 160 MG/5ML
1000 SUSPENSION ORAL ONCE
Refills: 0 | Status: COMPLETED | OUTPATIENT
Start: 2025-02-09 | End: 2025-02-09

## 2025-02-09 RX ADMIN — Medication 1000 MILLILITER(S): at 19:30

## 2025-02-09 RX ADMIN — ACETAMINOPHEN 400 MILLIGRAM(S): 160 SUSPENSION ORAL at 19:30

## 2025-02-09 RX ADMIN — ONDANSETRON 4 MILLIGRAM(S): 4 TABLET, ORALLY DISINTEGRATING ORAL at 19:30

## 2025-02-09 NOTE — ED PROVIDER NOTE - PATIENT PORTAL LINK FT
You can access the FollowMyHealth Patient Portal offered by [facility] by registering at the following website: [email or web address]. By joining [facility]’s FollowMyHealth portal, you will also be able to view your health information using other applications (apps) compatible with our system.

## 2025-02-09 NOTE — ED ADULT NURSE NOTE - OBJECTIVE STATEMENT
Pt received in bed alert and oriented and resting in bed with the c/o abd pain and constipation. Pt states that he has been having abd pain for a month along with constipation. Pt also admits that his  last BM was today a 4pm but it wasn't enough. Pt also c/o a productive cough for a month. Nursing care ongoing and safety maintained. pt awaiting ER MD vernon

## 2025-02-09 NOTE — ED PROVIDER NOTE - CPE EDP EYES NORM
August 28, 2020      Jose Maria Rai  [address] [place] [address]        Dear Jose Maria,     We received a refill request for your paroxetine medication.  This medication has been refilled for 30 days as you are due for an office visit for further refills.  Please call [phone] to schedule an appointment.        Sincerely,        Luan Law MD           normal...

## 2025-02-09 NOTE — ED PROVIDER NOTE - OBJECTIVE STATEMENT
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  States it is a dull ache across his mid and right upper abdomen.  Saw his GI doctor Friday and was given colon prep which he used with success however feels like he lost some weight from that.  Since then has been having trouble having a bowel movement but also admits that he has not been eating or drinking much due to nausea and discomfort.  Patient also saw his PMD  3 weeks ago.  Recently saw his endocrinologist as well.  Patient states he took himself off of Mounjaro and Jardiance recently.  Has noticed some orange color to his urine which she feels is from dehydration but it has not been tested for infection.  Patient feels that over the past few months he has lost weight.  Reports that 6 months ago he weighed 275 pounds in 3 weeks ago he was 240 pounds and feels that he is losing muscle mass.  Is due for to get a loop recorder due to suspected A-fib later this month.  Denies any wounds.  Was told that he should get a CT scan for his symptoms.  Worried about a space filling lesion.  Had a colonoscopy 10 years without issue, 3 years later had polyps, colonoscopy about 2 years ago that was not complete due to incomplete prep.  States he is due for a colonoscopy.  Denies fever or chills, chest pain, trouble breathing.  Reports occasional cough over the past few weeks which she feels is due to working in a cold environment.    Obtaining CT scan of abdomen and discussed with patient that can also scan his chest but he would rather just have a plain chest x-ray instead.

## 2025-02-09 NOTE — ED PROVIDER NOTE - PROGRESS NOTE DETAILS
Discussed lab and CT results with patient and wife at bedside.  Patient made aware that CT concerning for metastatic malignancy.  Discussed that patient would be discharged with outpatient follow-up however patient states that he will go to [facility] on his own. Pt signed out to me by Dr. Wise to f/u US results and dispo. Pt wishes to follow up with [facility] for further workup of liver mets.   US neg for DVT. Pt provided with copies of results

## 2025-02-09 NOTE — ED ADULT TRIAGE NOTE - CHIEF COMPLAINT QUOTE
pt states that he has been having abd pain for a month with constipation. pt states last BM was today a 4pm. pt also a productive cough for a month

## 2025-02-09 NOTE — ED PROVIDER NOTE - NSFOLLOWUPINSTRUCTIONS_ED_ALL_ED_FT
Your Lab work for concerning for potential malignancy.  Please follow-up at [facility] as discussed. Your Lab work is concerning for potential malignancy.  Please follow-up at [facility] as discussed.    Abdominal Pain    Many things can cause abdominal pain. Many times, abdominal pain is not caused by a disease and will improve without treatment. Your health care provider will do a physical exam to determine if there is a dangerous cause of your pain; blood tests and imaging may help determine the cause of your pain. However, in many cases, no cause may be found and you may need further testing as an outpatient. Monitor your abdominal pain for any changes.     SEEK IMMEDIATE MEDICAL CARE IF YOU HAVE ANY OF THE FOLLOWING SYMPTOMS: worsening abdominal pain, uncontrollable vomiting, profuse diarrhea, inability to have bowel movements or pass gas, black or bloody stools, fever accompanying chest pain or back pain, or fainting. These symptoms may represent a serious problem that is an emergency. Do not wait to see if the symptoms will go away. Get medical help right away. Call 911 and do not drive yourself to the hospital.

## 2025-02-09 NOTE — ED PROVIDER NOTE - CLINICAL SUMMARY MEDICAL DECISION MAKING FREE TEXT BOX
69-year-old male with significant past medical history for hypertension, hypothyroidism, diabetes with history of splenectomy presents to the ED with complaints of persistent upper abdominal pain associated with constipation x 6 weeks.  Patient was seen by GI 2 days ago and given bowel prep to help with symptoms.  Patient states pain worse than before.  Positive bowel movement today.  Patient denies any nausea or vomiting.  Patient also complaining of a mildly productive cough x 5 weeks.  Denies any fevers.  Patient works at a assisted.  Labs, CT, x-ray rule out malignancy, constipation, obstruction, colitis, pneumonia.

## 2025-02-09 NOTE — ED ADULT NURSE NOTE - NSFALLUNIVINTERV_ED_ALL_ED
Bed/Stretcher in lowest position, wheels locked, appropriate side rails in place/Call bell, personal items and telephone in reach/Instruct patient to call for assistance before getting out of bed/chair/stretcher/Non-slip footwear applied when patient is off stretcher/[place] to call system/Physically safe environment - no spills, clutter or unnecessary equipment/Purposeful proactive rounding/Room/bathroom lighting operational, light cord in reach
numerical 0-10

## 2025-02-09 NOTE — ED PROVIDER NOTE - CARE PLAN
Principal Discharge DX:	Abdominal pain   1 Principal Discharge DX:	Abdominal pain  Secondary Diagnosis:	Metastatic disease

## 2025-02-10 VITALS
SYSTOLIC BLOOD PRESSURE: 134 MMHG | OXYGEN SATURATION: 97 % | DIASTOLIC BLOOD PRESSURE: 79 MMHG | RESPIRATION RATE: 18 BRPM | TEMPERATURE: 98 F | HEART RATE: 79 BPM

## 2025-02-10 PROCEDURE — 93970 EXTREMITY STUDY: CPT | Mod: 26
